# Patient Record
Sex: FEMALE | Race: WHITE | HISPANIC OR LATINO
[De-identification: names, ages, dates, MRNs, and addresses within clinical notes are randomized per-mention and may not be internally consistent; named-entity substitution may affect disease eponyms.]

---

## 2018-05-03 ENCOUNTER — APPOINTMENT (OUTPATIENT)
Dept: OTOLARYNGOLOGY | Facility: CLINIC | Age: 73
End: 2018-05-03
Payer: MEDICARE

## 2018-05-03 VITALS
HEIGHT: 62 IN | SYSTOLIC BLOOD PRESSURE: 122 MMHG | DIASTOLIC BLOOD PRESSURE: 80 MMHG | BODY MASS INDEX: 29.81 KG/M2 | WEIGHT: 162 LBS

## 2018-05-03 DIAGNOSIS — H93.11 TINNITUS, RIGHT EAR: ICD-10-CM

## 2018-05-03 DIAGNOSIS — H91.93 UNSPECIFIED HEARING LOSS, BILATERAL: ICD-10-CM

## 2018-05-03 DIAGNOSIS — Z78.9 OTHER SPECIFIED HEALTH STATUS: ICD-10-CM

## 2018-05-03 DIAGNOSIS — Z87.891 PERSONAL HISTORY OF NICOTINE DEPENDENCE: ICD-10-CM

## 2018-05-03 PROBLEM — Z00.00 ENCOUNTER FOR PREVENTIVE HEALTH EXAMINATION: Status: ACTIVE | Noted: 2018-05-03

## 2018-05-03 PROCEDURE — 92557 COMPREHENSIVE HEARING TEST: CPT

## 2018-05-03 PROCEDURE — 99203 OFFICE O/P NEW LOW 30 MIN: CPT | Mod: 25

## 2018-05-03 PROCEDURE — G0268 REMOVAL OF IMPACTED WAX MD: CPT

## 2018-05-03 PROCEDURE — 92550 TYMPANOMETRY & REFLEX THRESH: CPT

## 2022-08-30 ENCOUNTER — APPOINTMENT (OUTPATIENT)
Dept: SURGERY | Facility: CLINIC | Age: 77
End: 2022-08-30

## 2022-08-30 VITALS
WEIGHT: 155 LBS | OXYGEN SATURATION: 91 % | TEMPERATURE: 97.1 F | HEART RATE: 59 BPM | BODY MASS INDEX: 28.52 KG/M2 | HEIGHT: 62 IN

## 2022-08-30 PROCEDURE — 99202 OFFICE O/P NEW SF 15 MIN: CPT

## 2022-08-30 RX ORDER — MULTIVITAMIN
TABLET ORAL
Refills: 0 | Status: ACTIVE | COMMUNITY

## 2022-08-30 NOTE — HISTORY OF PRESENT ILLNESS
[de-identified] : Patient presents to the office with a history of large mass on the left posterior shoulder region that she noticed more than a year ago.  As per patient she is uncomfortable with that mass.

## 2022-08-30 NOTE — PHYSICAL EXAM
[Alert] : alert [Oriented to Person] : oriented to person [Oriented to Place] : oriented to place [Oriented to Time] : oriented to time [Calm] : calm [de-identified] : Patient is in no acute distress. [de-identified] : In the left posterior shoulder region patient has a mass approximately 6 cm plus in size in diameter.  The mass could be attached to the underlying muscle.  Is very large and unable to move it freely from the underlying structures. [de-identified] : There are no skin changes overlying the mass.

## 2022-08-30 NOTE — CONSULT LETTER
[Dear  ___] : Dear  [unfilled], [Consult Letter:] : I had the pleasure of evaluating your patient, [unfilled]. [Please see my note below.] : Please see my note below. [Consult Closing:] : Thank you very much for allowing me to participate in the care of this patient.  If you have any questions, please do not hesitate to contact me. [Sincerely,] : Sincerely, [FreeTextEntry3] : Mark Aguilar MD, FACS\par [facility]\[place] [address]\par

## 2022-08-30 NOTE — ASSESSMENT
[FreeTextEntry1] : After examination patient was explained about most likely diagnosis of lipoma.  She was also explained that most likely is not cancerous.  Patient is uncomfortable and would like to have the mass removed.  Surgery risks complications were explained in detail.  Patient understands the surgery and informed consent was obtained.  Surgery to be scheduled at her convenience.

## 2022-08-31 ENCOUNTER — NON-APPOINTMENT (OUTPATIENT)
Age: 77
End: 2022-08-31

## 2022-09-14 ENCOUNTER — OUTPATIENT (OUTPATIENT)
Dept: OUTPATIENT SERVICES | Facility: HOSPITAL | Age: 77
LOS: 1 days | Discharge: HOME | End: 2022-09-14

## 2022-09-14 ENCOUNTER — RESULT REVIEW (OUTPATIENT)
Age: 77
End: 2022-09-14

## 2022-09-14 VITALS
SYSTOLIC BLOOD PRESSURE: 180 MMHG | WEIGHT: 151.9 LBS | OXYGEN SATURATION: 99 % | RESPIRATION RATE: 16 BRPM | TEMPERATURE: 98 F | DIASTOLIC BLOOD PRESSURE: 90 MMHG | HEIGHT: 62 IN | HEART RATE: 64 BPM

## 2022-09-14 DIAGNOSIS — Z90.710 ACQUIRED ABSENCE OF BOTH CERVIX AND UTERUS: Chronic | ICD-10-CM

## 2022-09-14 DIAGNOSIS — Z90.49 ACQUIRED ABSENCE OF OTHER SPECIFIED PARTS OF DIGESTIVE TRACT: Chronic | ICD-10-CM

## 2022-09-14 DIAGNOSIS — D17.22 BENIGN LIPOMATOUS NEOPLASM OF SKIN AND SUBCUTANEOUS TISSUE OF LEFT ARM: ICD-10-CM

## 2022-09-14 DIAGNOSIS — Z01.818 ENCOUNTER FOR OTHER PREPROCEDURAL EXAMINATION: ICD-10-CM

## 2022-09-14 LAB
ALBUMIN SERPL ELPH-MCNC: 4.3 G/DL — SIGNIFICANT CHANGE UP (ref 3.5–5.2)
ALP SERPL-CCNC: 102 U/L — SIGNIFICANT CHANGE UP (ref 30–115)
ALT FLD-CCNC: 14 U/L — SIGNIFICANT CHANGE UP (ref 0–41)
ANION GAP SERPL CALC-SCNC: 11 MMOL/L — SIGNIFICANT CHANGE UP (ref 7–14)
AST SERPL-CCNC: 20 U/L — SIGNIFICANT CHANGE UP (ref 0–41)
BASOPHILS # BLD AUTO: 0.05 K/UL — SIGNIFICANT CHANGE UP (ref 0–0.2)
BASOPHILS NFR BLD AUTO: 1 % — SIGNIFICANT CHANGE UP (ref 0–1)
BILIRUB SERPL-MCNC: 0.5 MG/DL — SIGNIFICANT CHANGE UP (ref 0.2–1.2)
BUN SERPL-MCNC: 14 MG/DL — SIGNIFICANT CHANGE UP (ref 10–20)
CALCIUM SERPL-MCNC: 9.2 MG/DL — SIGNIFICANT CHANGE UP (ref 8.4–10.5)
CHLORIDE SERPL-SCNC: 107 MMOL/L — SIGNIFICANT CHANGE UP (ref 98–110)
CO2 SERPL-SCNC: 23 MMOL/L — SIGNIFICANT CHANGE UP (ref 17–32)
CREAT SERPL-MCNC: 1 MG/DL — SIGNIFICANT CHANGE UP (ref 0.7–1.5)
EGFR: 58 ML/MIN/1.73M2 — LOW
EOSINOPHIL # BLD AUTO: 0.14 K/UL — SIGNIFICANT CHANGE UP (ref 0–0.7)
EOSINOPHIL NFR BLD AUTO: 2.9 % — SIGNIFICANT CHANGE UP (ref 0–8)
GLUCOSE SERPL-MCNC: 88 MG/DL — SIGNIFICANT CHANGE UP (ref 70–99)
HCT VFR BLD CALC: 36.5 % — LOW (ref 37–47)
HGB BLD-MCNC: 11.7 G/DL — LOW (ref 12–16)
IMM GRANULOCYTES NFR BLD AUTO: 0.2 % — SIGNIFICANT CHANGE UP (ref 0.1–0.3)
LYMPHOCYTES # BLD AUTO: 1.47 K/UL — SIGNIFICANT CHANGE UP (ref 1.2–3.4)
LYMPHOCYTES # BLD AUTO: 30.3 % — SIGNIFICANT CHANGE UP (ref 20.5–51.1)
MCHC RBC-ENTMCNC: 27.7 PG — SIGNIFICANT CHANGE UP (ref 27–31)
MCHC RBC-ENTMCNC: 32.1 G/DL — SIGNIFICANT CHANGE UP (ref 32–37)
MCV RBC AUTO: 86.5 FL — SIGNIFICANT CHANGE UP (ref 81–99)
MONOCYTES # BLD AUTO: 0.28 K/UL — SIGNIFICANT CHANGE UP (ref 0.1–0.6)
MONOCYTES NFR BLD AUTO: 5.8 % — SIGNIFICANT CHANGE UP (ref 1.7–9.3)
NEUTROPHILS # BLD AUTO: 2.9 K/UL — SIGNIFICANT CHANGE UP (ref 1.4–6.5)
NEUTROPHILS NFR BLD AUTO: 59.8 % — SIGNIFICANT CHANGE UP (ref 42.2–75.2)
NRBC # BLD: 0 /100 WBCS — SIGNIFICANT CHANGE UP (ref 0–0)
PLATELET # BLD AUTO: 261 K/UL — SIGNIFICANT CHANGE UP (ref 130–400)
POTASSIUM SERPL-MCNC: 4.4 MMOL/L — SIGNIFICANT CHANGE UP (ref 3.5–5)
POTASSIUM SERPL-SCNC: 4.4 MMOL/L — SIGNIFICANT CHANGE UP (ref 3.5–5)
PROT SERPL-MCNC: 6.7 G/DL — SIGNIFICANT CHANGE UP (ref 6–8)
RBC # BLD: 4.22 M/UL — SIGNIFICANT CHANGE UP (ref 4.2–5.4)
RBC # FLD: 14.1 % — SIGNIFICANT CHANGE UP (ref 11.5–14.5)
SODIUM SERPL-SCNC: 141 MMOL/L — SIGNIFICANT CHANGE UP (ref 135–146)
WBC # BLD: 4.85 K/UL — SIGNIFICANT CHANGE UP (ref 4.8–10.8)
WBC # FLD AUTO: 4.85 K/UL — SIGNIFICANT CHANGE UP (ref 4.8–10.8)

## 2022-09-14 PROCEDURE — 71046 X-RAY EXAM CHEST 2 VIEWS: CPT | Mod: 26

## 2022-09-14 PROCEDURE — 93010 ELECTROCARDIOGRAM REPORT: CPT

## 2022-09-14 NOTE — H&P PST ADULT - HISTORY OF PRESENT ILLNESS
76yr old female states noted a lump on her LEFT back near the shoulder blade  about 1yr ago - " is  uncomfortable" is scheduled for EXCISION OF LEFT POSTERIOR SHOULDER MASS. Denies COVID S/S. Recd 2 doses vaccine. Verbalized understanding of COVID prevention measures. Exercise sharon 1-2  FOS.  Anesthesia Alert  NO--Difficult Airway  NO--History of neck surgery or radiation  NO--Limited ROM of neck  NO--History of Malignant hyperthermia  NO--Personal or family history of Pseudocholinesterase deficiency  NO--Prior Anesthesia Complication  NO--Latex Allergy  NO--Loose teeth  NO--History of Rheumatoid Arthritis  NO--CYNTHIA  No Bleeding risk  NO--Other_____

## 2022-09-25 ENCOUNTER — LABORATORY RESULT (OUTPATIENT)
Age: 77
End: 2022-09-25

## 2022-09-28 ENCOUNTER — TRANSCRIPTION ENCOUNTER (OUTPATIENT)
Age: 77
End: 2022-09-28

## 2022-09-28 ENCOUNTER — RESULT REVIEW (OUTPATIENT)
Age: 77
End: 2022-09-28

## 2022-09-28 ENCOUNTER — OUTPATIENT (OUTPATIENT)
Dept: OUTPATIENT SERVICES | Facility: HOSPITAL | Age: 77
LOS: 1 days | Discharge: HOME | End: 2022-09-28

## 2022-09-28 ENCOUNTER — APPOINTMENT (OUTPATIENT)
Dept: SURGERY | Facility: HOSPITAL | Age: 77
End: 2022-09-28

## 2022-09-28 VITALS — SYSTOLIC BLOOD PRESSURE: 151 MMHG | RESPIRATION RATE: 15 BRPM | DIASTOLIC BLOOD PRESSURE: 73 MMHG | HEART RATE: 70 BPM

## 2022-09-28 VITALS
OXYGEN SATURATION: 98 % | SYSTOLIC BLOOD PRESSURE: 192 MMHG | RESPIRATION RATE: 17 BRPM | TEMPERATURE: 97 F | WEIGHT: 149.91 LBS | HEIGHT: 62 IN | DIASTOLIC BLOOD PRESSURE: 86 MMHG | HEART RATE: 78 BPM

## 2022-09-28 DIAGNOSIS — Z90.710 ACQUIRED ABSENCE OF BOTH CERVIX AND UTERUS: Chronic | ICD-10-CM

## 2022-09-28 DIAGNOSIS — Z90.49 ACQUIRED ABSENCE OF OTHER SPECIFIED PARTS OF DIGESTIVE TRACT: Chronic | ICD-10-CM

## 2022-09-28 PROCEDURE — 88305 TISSUE EXAM BY PATHOLOGIST: CPT | Mod: 26

## 2022-09-28 PROCEDURE — 23076 EXC SHOULDER TUM DEEP < 5 CM: CPT

## 2022-09-28 RX ORDER — HYDROMORPHONE HYDROCHLORIDE 2 MG/ML
0.5 INJECTION INTRAMUSCULAR; INTRAVENOUS; SUBCUTANEOUS
Refills: 0 | Status: DISCONTINUED | OUTPATIENT
Start: 2022-09-28 | End: 2022-09-28

## 2022-09-28 RX ORDER — SODIUM CHLORIDE 9 MG/ML
1000 INJECTION, SOLUTION INTRAVENOUS
Refills: 0 | Status: DISCONTINUED | OUTPATIENT
Start: 2022-09-28 | End: 2022-09-28

## 2022-09-28 RX ORDER — ASPIRIN/CALCIUM CARB/MAGNESIUM 324 MG
1 TABLET ORAL
Qty: 0 | Refills: 0 | DISCHARGE

## 2022-09-28 RX ORDER — LISINOPRIL 2.5 MG/1
1 TABLET ORAL
Qty: 0 | Refills: 0 | DISCHARGE

## 2022-09-28 RX ADMIN — SODIUM CHLORIDE 75 MILLILITER(S): 9 INJECTION, SOLUTION INTRAVENOUS at 09:50

## 2022-09-28 NOTE — CHART NOTE - NSCHARTNOTEFT_GEN_A_CORE
PACU ANESTHESIA ADMISSION NOTE      Procedure: Excision, neoplasm, subfascial soft tissue, shoulder region, less than 5 cm    Excision, intramuscular tumor, shoulder area, less than 5 cm      Post op diagnosis:  Lipoma of left shoulder        ____  Intubated  TV:______       Rate: ______      FiO2: ______    _x___  Patent Airway    _x___  Full return of protective reflexes    _x___  Full recovery from anesthesia / back to baseline status    Vitals:  T(C): 37.5  HR: 66  BP: 148/65  RR: 17   SpO2: 100%    Mental Status:  _x___ Awake   _____ Alert   _____ Drowsy   _____ Sedated    Nausea/Vomiting:  _x___  NO       ______Yes,   See Post - Op Orders         Pain Scale (0-10):  __0___    Treatment: _x___ None    ____ See Post - Op/PCA Orders    Post - Operative Fluids:   __x__ Oral   ____ See Post - Op Orders    Plan: Discharge:   _x___Home       _____Floor     _____Critical Care    _____  Other:_________________    Comments:  No anesthesia issues or complications noted.  Discharge when criteria met.

## 2022-09-28 NOTE — BRIEF OPERATIVE NOTE - NSICDXBRIEFPROCEDURE_GEN_ALL_CORE_FT
PROCEDURES:  Excision, neoplasm, subfascial soft tissue, shoulder region, less than 5 cm 28-Sep-2022 09:18:10  Mark Aguilar

## 2022-09-28 NOTE — ASU PATIENT PROFILE, ADULT - FALL HARM RISK - UNIVERSAL INTERVENTIONS
Bed in lowest position, wheels locked, appropriate side rails in place/Call bell, personal items and telephone in reach/Instruct patient to call for assistance before getting out of bed or chair/Non-slip footwear when patient is out of bed/[place] to call system/Physically safe environment - no spills, clutter or unnecessary equipment/Purposeful Proactive Rounding/Room/bathroom lighting operational, light cord in reach

## 2022-09-28 NOTE — ASU DISCHARGE PLAN (ADULT/PEDIATRIC) - NS MD DC FALL RISK RISK
For information on Fall & Injury Prevention, visit: [email or web address] OR  [email or web address] OR  https://www.cdc.gov/steadi/patient.html

## 2022-09-28 NOTE — ASU DISCHARGE PLAN (ADULT/PEDIATRIC) - CARE PROVIDER_API CALL
Mark Aguilar)  Surgery  [address] [place] [address]  Phone: [phone]  Fax: [phone]  Follow Up Time:

## 2022-09-28 NOTE — ASU DISCHARGE PLAN (ADULT/PEDIATRIC) - ASU DC SPECIAL INSTRUCTIONSFT
Please do not shower until 24 hours post surgery. Upon showering, pat dressing dry. May remove dressing in 48 hours. Please take Tylenol and Motrin as needed for pain relief. You may resume your regular diet. Please reji the office if you have any questions or concerns at the number provided below. Please call number provided to make a follow up appointment with Dr. Aguilar in 1-2 weeks.

## 2022-09-28 NOTE — ASU PREOP CHECKLIST - DENTURES
What Type Of Note Output Would You Prefer (Optional)?: Bullet Format
How Severe Is Your Rash?: mild
Is This A New Presentation, Or A Follow-Up?: Rash
no

## 2022-09-30 LAB — SURGICAL PATHOLOGY STUDY: SIGNIFICANT CHANGE UP

## 2022-10-04 DIAGNOSIS — Z79.82 LONG TERM (CURRENT) USE OF ASPIRIN: ICD-10-CM

## 2022-10-04 DIAGNOSIS — R22.32 LOCALIZED SWELLING, MASS AND LUMP, LEFT UPPER LIMB: ICD-10-CM

## 2022-10-04 DIAGNOSIS — D17.22 BENIGN LIPOMATOUS NEOPLASM OF SKIN AND SUBCUTANEOUS TISSUE OF LEFT ARM: ICD-10-CM

## 2022-10-06 ENCOUNTER — APPOINTMENT (OUTPATIENT)
Dept: SURGERY | Facility: CLINIC | Age: 77
End: 2022-10-06

## 2022-10-06 VITALS
DIASTOLIC BLOOD PRESSURE: 84 MMHG | BODY MASS INDEX: 28.52 KG/M2 | WEIGHT: 155 LBS | HEART RATE: 57 BPM | OXYGEN SATURATION: 99 % | TEMPERATURE: 96.9 F | HEIGHT: 62 IN | SYSTOLIC BLOOD PRESSURE: 126 MMHG

## 2022-10-06 DIAGNOSIS — D17.22 BENIGN LIPOMATOUS NEOPLASM OF SKIN AND SUBCUTANEOUS TISSUE OF LEFT ARM: ICD-10-CM

## 2022-10-06 PROCEDURE — 99024 POSTOP FOLLOW-UP VISIT: CPT

## 2022-10-06 NOTE — ASSESSMENT
[FreeTextEntry1] : healing well.
gait, locomotion, and balance/joint integrity and mobility/muscle strength/neuromotor development and sensory integration/posture

## 2022-10-06 NOTE — REASON FOR VISIT
[Follow-Up: _____] : a [unfilled] follow-up visit [FreeTextEntry1] : Pt here for post op visit exc of left posterior shoulder

## 2023-01-17 ENCOUNTER — EMERGENCY (EMERGENCY)
Facility: HOSPITAL | Age: 78
LOS: 0 days | Discharge: HOME | End: 2023-01-17
Attending: EMERGENCY MEDICINE | Admitting: EMERGENCY MEDICINE
Payer: MEDICARE

## 2023-01-17 VITALS
OXYGEN SATURATION: 97 % | TEMPERATURE: 98 F | DIASTOLIC BLOOD PRESSURE: 93 MMHG | RESPIRATION RATE: 18 BRPM | HEART RATE: 69 BPM | SYSTOLIC BLOOD PRESSURE: 210 MMHG

## 2023-01-17 VITALS — SYSTOLIC BLOOD PRESSURE: 203 MMHG | DIASTOLIC BLOOD PRESSURE: 85 MMHG

## 2023-01-17 DIAGNOSIS — Z90.710 ACQUIRED ABSENCE OF BOTH CERVIX AND UTERUS: Chronic | ICD-10-CM

## 2023-01-17 DIAGNOSIS — Z90.710 ACQUIRED ABSENCE OF BOTH CERVIX AND UTERUS: ICD-10-CM

## 2023-01-17 DIAGNOSIS — M25.532 PAIN IN LEFT WRIST: ICD-10-CM

## 2023-01-17 DIAGNOSIS — Z90.49 ACQUIRED ABSENCE OF OTHER SPECIFIED PARTS OF DIGESTIVE TRACT: Chronic | ICD-10-CM
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
## 2023-01-17 DIAGNOSIS — Z79.82 LONG TERM (CURRENT) USE OF ASPIRIN: ICD-10-CM

## 2023-01-17 DIAGNOSIS — S92.352A DISPLACED FRACTURE OF FIFTH METATARSAL BONE, LEFT FOOT, INITIAL ENCOUNTER FOR CLOSED FRACTURE: ICD-10-CM

## 2023-01-17 DIAGNOSIS — M79.672 PAIN IN LEFT FOOT: ICD-10-CM

## 2023-01-17 DIAGNOSIS — H91.90 UNSPECIFIED HEARING LOSS, UNSPECIFIED EAR: ICD-10-CM

## 2023-01-17 DIAGNOSIS — S52.572A OTHER INTRAARTICULAR FRACTURE OF LOWER END OF LEFT RADIUS, INITIAL ENCOUNTER FOR CLOSED FRACTURE: ICD-10-CM
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
## 2023-01-17 DIAGNOSIS — S92.342A DISPLACED FRACTURE OF FOURTH METATARSAL BONE, LEFT FOOT, INITIAL ENCOUNTER FOR CLOSED FRACTURE: ICD-10-CM

## 2023-01-17 DIAGNOSIS — S92.332A DISPLACED FRACTURE OF THIRD METATARSAL BONE, LEFT FOOT, INITIAL ENCOUNTER FOR CLOSED FRACTURE: ICD-10-CM

## 2023-01-17 DIAGNOSIS — I10 ESSENTIAL (PRIMARY) HYPERTENSION: ICD-10-CM

## 2023-01-17 DIAGNOSIS — Z90.49 ACQUIRED ABSENCE OF OTHER SPECIFIED PARTS OF DIGESTIVE TRACT: ICD-10-CM

## 2023-01-17 PROCEDURE — 73110 X-RAY EXAM OF WRIST: CPT | Mod: 26,LT

## 2023-01-17 PROCEDURE — 99285 EMERGENCY DEPT VISIT HI MDM: CPT | Mod: 57

## 2023-01-17 PROCEDURE — 28470 CLTX METATARSAL FX WO MNP EA: CPT | Mod: 54

## 2023-01-17 PROCEDURE — 73630 X-RAY EXAM OF FOOT: CPT | Mod: 26,LT

## 2023-01-17 RX ORDER — IBUPROFEN 200 MG
600 TABLET ORAL ONCE
Refills: 0 | Status: COMPLETED | OUTPATIENT
Start: 2023-01-17 | End: 2023-01-17

## 2023-01-17 RX ADMIN — Medication 600 MILLIGRAM(S): at 20:25

## 2023-01-17 NOTE — ED PROVIDER NOTE - CLINICAL SUMMARY MEDICAL DECISION MAKING FREE TEXT BOX
77-year-old female no antiplatelet or anticoagulation presents with trip and fall over a tree branch this afternoon around 2:30 PM landed on left side no close head injury no LOC was helped out by bystanders.  Went to urgent care had x-rays that are confirmed in ED here today patient sustained  on independent  interpretation of wrist and foot xray -  left distal radius impacted fracture with mild angulation nvi.  Multiple metatarsal fractures left foot.  Posterior splint is applied to the left foot sugar-tong splint to left wrist patient understands she needs follow-up with orthopedic surgery follow-up arranged.  No other injuries on exam.

## 2023-01-17 NOTE — ED ADULT NURSE NOTE - NSIMPLEMENTINTERV_GEN_ALL_ED
Implemented All Fall with Harm Risk Interventions:  [place] to call system. Call bell, personal items and telephone within reach. Instruct patient to call for assistance. Room bathroom lighting operational. Non-slip footwear when patient is off stretcher. Physically safe environment: no spills, clutter or unnecessary equipment. Stretcher in lowest position, wheels locked, appropriate side rails in place. Provide visual cue, wrist band, yellow gown, etc. Monitor gait and stability. Monitor for mental status changes and reorient to person, place, and time. Review medications for side effects contributing to fall risk. Reinforce activity limits and safety measures with patient and family. Provide visual clues: red socks.

## 2023-01-17 NOTE — ED PROVIDER NOTE - OBJECTIVE STATEMENT
77 year old female past medical history of Hypertension comes to emergency room status post fall and broken left wrist/foot. patient denies head injury and loss of consciousness. Pt seen in out patient urgent care center and had xray which showed broken bones and sent to emergency room for further evaluation.

## 2023-01-17 NOTE — ED PROVIDER NOTE - NS ED ATTENDING STATEMENT MOD
I have seen and examined this patient and fully participated in the care of this patient as the teaching attending.  The service was shared with the ARLENE.  I reviewed and verified the documentation and independently performed the documented:

## 2023-01-17 NOTE — ED PROVIDER NOTE - NSFOLLOWUPINSTRUCTIONS_ED_ALL_ED_FT
Our Emergency Department Referral Coordinators will be reaching out ot you in the next 24-48 hours from 9:00am to 5:00pm (Monday to Friday) with a follow up appointment. Please expect a phone call from the hospital in that time frame. If you do not receive a call or if you have any questions or concerns, you can reach them at [phone] or [phone].       Use wheelchair to get around given foot and wrist fracture     SPLINT CARE - AfterCare(R) Instructions(ER/ED)     Splint Care    WHAT YOU NEED TO KNOW:    Splint care is important to help protect your splint until it comes off. Some splints are made of fiberglass or plaster that will need to dry and harden. Splint care will help the splint dry and harden correctly. Even after your splint hardens, it can be damaged.    DISCHARGE INSTRUCTIONS:    Return to the emergency department if:     You have increased pain.      Your fingers or toes are numb or tingling.      You feel burning or stinging around your injury.      Your nails, fingers, or toes turn pale, blue, or gray, and feel cold.      You have new or increased trouble moving your fingers or toes.      Your swelling gets worse.      The skin under your splint is bleeding or leaking pus.     Contact your healthcare provider if:     Your hard splint gets wet or is damaged.      You have a fever.      Your splint feels tighter.      You have itchy, dry skin under your splint that is getting worse.      The skin under your splint is red, or you have a new sore.      You notice a bad smell coming from your splint.       You have questions or concerns about your condition or care.    How to care for your splint:     Wait for your hard splint to harden completely. You may have to wait up to 3 days before you can walk on a plaster splint.      Check your splint and the skin around it each day. Check your splint for damage, such as cracks and breaks. Check your skin for redness, increased swelling, and sores. Loosen the elastic bandage around your splint if it feels too tight.      Keep your splint clean and dry. Keep dirt out of your splint. Before you bathe, wrap your hard splint with 2 layers of plastic. Then put a plastic bag over it. Keep the plastic bag tightly sealed. You can also ask your healthcare provider about waterproof shields. Do not put your hard splint in the water, even with a plastic bag over it. A wet splint can make your skin itchy, and may lead to infection.      Do not put powders or deodorants inside your splint. These can dry your skin and increase itching.       Do not try to scratch the skin inside your hard splint with sharp objects. Sharp objects can break off inside your splint or hurt your skin.       Do not pull the padding out of your splint. The padding inside your splint protects your skin. You may develop a sore on your skin if you take out the padding.    Follow up with your healthcare provider as directed within 1 to 2 weeks: Write down your questions so you remember to ask them during your visits.          Wrist Fracture Treated With Immobilization  ImageA wrist fracture is a break or crack in one of the bones of your wrist. Your wrist is made up of eight small bones at the palm of your hand (carpal bones) and two long bones that make up your forearm (radius and ulna).    If the joint is stable and the bones are still in their normal position (nondisplaced), the injury may be treated with immobilization. This involves the use of a cast, splint, or sling to hold your arm in place. Immobilization ensures that your bones continue to stay in the correct position while your arm is healing.    What are the causes?  This condition may be caused by:    A direct force to the wrist.  Falling on an outstretched hand.  Trauma, such as a car accident or a fall.    What increases the risk?  This condition is more likely to develop in people who:    Do contact and high-risk sports, such as skiing, biking, and ice skating.  Take steroid medicines.  Smoke.  Are female.  Are .  Drink more than three alcoholic beverages per day.  Have low or lowered bone density (osteoporosis or osteopenia).  Are older.  Have a history of previous fractures.    What are the signs or symptoms?  Symptoms of this condition include:    Pain.  Swelling.  Bruising.  Not being able to move the wrist normally.    Additionally, the wrist may hang in an odd position or appear deformed.    How is this diagnosed?  This condition may be diagnosed based on a physical exam and X-rays. You may also have a CT scan or MRI.    How is this treated?  Treatment for this condition involves wearing a cast or splint until the injured area is stable enough for you to begin range-of-motion exercises. You also may be given a sling. You may also be prescribed pain medicine.    Follow these instructions at home:  If you have a splint:     Wear the splint as told by your health care provider. Remove it only as told by your health care provider.  Loosen the splint if your fingers tingle, become numb, or turn cold and blue.  Do not let your splint get wet if it is not waterproof.  Keep the splint clean.  If you have a sling:     Wear it as told by your health care provider. Remove it only as told by your health care provider.  If you have a cast:     Do not stick anything inside the cast to scratch your skin. Doing that increases your risk of infection.  Check the skin around the cast every day. Report any concerns to your health care provider. You may put lotion on dry skin around the edges of the cast. Do not apply lotion to the skin underneath the cast.  Do not let your cast get wet if it is not waterproof.  Keep the cast clean.  Bathing     Do not take baths, swim, or use a hot tub until your health care provider approves. Ask your health care provider if you can take showers. You may only be allowed to take sponge baths for bathing.  If your cast or splint is not waterproof, cover it with a watertight plastic bag when you take a bath or a shower.  If you have a sling, remove it for bathing only if your health care provider tells you that it is safe to do that.  Managing pain, stiffness, and swelling     If directed, apply ice to the injured area.    Put ice in a plastic bag.  Place a towel between your skin and the bag.  Leave the ice on for 20 minutes, 2–3 times per day.    Move your fingers often to avoid stiffness and to lessen swelling.  Raise (elevate) the injured area above the level of your heart while you are sitting or lying down.  Driving     Do not drive or operate heavy machinery while taking prescription pain medicine.  Ask your health care provider when it is safe to drive if you have a cast, splint, or sling on your wrist.  Activity     Return to your normal activities as told by your health care provider. Ask your health care provider what activities are safe for you.  Do range-of-motion exercises only as told by your health care provider or physical therapist.  General instructions     Do not put pressure on any part of the cast or splint until it is fully hardened. This may take several hours.  Do not use any tobacco products, such as cigarettes, chewing tobacco, and e-cigarettes. Tobacco can delay bone healing. If you need help quitting, ask your health care provider.  Take over-the-counter and prescription medicines only as told by your health care provider.  Keep all follow-up visits as told by your health care provider. This is important.  Contact a health care provider if:  Your cast, splint, or sling is damaged or loose.  You have any new pain, swelling, or bruising.  Your pain, swelling, and bruising do not improve.  You have a fever.  You have chills.  Get help right away if:  Your skin or fingers on your injured arm turn blue or gray.  Your arm feels cold or gets numb.  You have severe pain in your injured wrist.  This information is not intended to replace advice given to you by your health care provider. Make sure you discuss any questions you have with your health care provider          WHAT YOU NEED TO KNOW:    A foot fracture is a break in a bone in your foot.    Foot Anatomy         DISCHARGE INSTRUCTIONS:    Call your local emergency number (911 in the [place]) if:   •You suddenly feel lightheaded and short of breath.      •You have chest pain when you take a deep breath or cough.      •You cough up blood.      Return to the emergency department if:   •The pain in your injured foot gets worse even after you rest and take pain medicine.      •The skin or toes of your foot become numb, swollen, cold, white, or blue.      •You have more pain or swelling than you did before a cast was put on.      •Your leg feels warm, tender, and painful. It may look swollen and red.      Call your doctor if:   •You have a fever.      •You have new sores around your boot, cast, or splint.      •You have new or worsening trouble moving your foot.      •You notice a foul smell coming from under your cast.      •Your boot, cast, or splint gets damaged.      •You have questions or concerns about your condition or care.      Medicines: You may need any of the following:   •Antibiotics: This medicine is given to help treat or prevent an infection caused by bacteria.       •NSAIDs, such as ibuprofen, help decrease swelling, pain, and fever. NSAIDs can cause stomach bleeding or kidney problems in certain people. If you take blood thinner medicine, always ask your healthcare provider if NSAIDs are safe for you. Always read the medicine label and follow directions.      •Prescription pain medicine may be given. Ask your healthcare provider how to take this medicine safely. Some prescription pain medicines contain acetaminophen. Do not take other medicines that contain acetaminophen without talking to your healthcare provider. Too much acetaminophen may cause liver damage. Prescription pain medicine may cause constipation. Ask your healthcare provider how to prevent or treat constipation.       •Take your medicine as directed. Contact your healthcare provider if you think your medicine is not helping or if you have side effects. Tell him or her if you are allergic to any medicine. Keep a list of the medicines, vitamins, and herbs you take. Include the amounts, and when and why you take them. Bring the list or the pill bottles to follow-up visits. Carry your medicine list with you in case of an emergency.      Self-care:   •Rest your foot and avoid activities that cause pain.      •Apply ice to decrease swelling and pain, and to prevent tissue damage. Use an ice pack, or put crushed ice in a plastic bag. Cover it with a towel before you apply it. Use ice for 15 to 20 minutes every hour or as directed.      •Elevate your foot above the level of your heart as often as you can. This will help decrease swelling and pain. Prop your foot on pillows or blankets to keep it elevated comfortably.  Elevate Leg           •Physical therapy may be needed when your foot has healed. A physical therapist can teach you exercises to help improve movement and strength, and to decrease pain.      Cast or splint care:   •Ask when it is okay to take a bath or shower. Do not let your cast or splint get wet. Before you bathe, cover the cast or splint with a plastic bag. Tape the bag to your skin above the splint to seal out water. Keep your foot out of the water in case the bag leaks.      •Check the skin around your splint daily for any redness or open areas.      •Do not use a sharp or pointed object to scratch your skin under the splint.      •Do not remove your splint unless your healthcare provider or orthopedic surgeon says it is okay.      Assistive devices: You may be given a hard-soled shoe to wear while your foot is healing. You also may need to use crutches to help you walk while your foot heals. It is important to use your crutches correctly. Ask for more information about how to use crutches.    Follow up with your doctor or bone specialist as directed: You may need to return to have your splint or stitches removed. You may also need to return for tests to make sure your foot is healing. Write down your questions so you remember to ask them during your visits.

## 2023-01-17 NOTE — ED PROVIDER NOTE - PHYSICAL EXAMINATION
Physical Exam    Vital Signs: I have reviewed the initial vital signs.  Constitutional: well-nourished, appears stated age, no acute distress  Musculoskeletal: + left wrist swelling and tenderness with equal radial pulses, no snuff box tenderness, + left foot tenderness and swelling over dorsum with ecchymosis DP pulses are equal.   Integumentary: warm, dry, no rash  Neurologic: awake, alert,  extremities’ motor and sensory functions grossly intact  Psychiatric: appropriate mood, appropriate affect

## 2023-01-17 NOTE — ED PROVIDER NOTE - CARE PLAN
1 Principal Discharge DX:	Wrist fracture, closed  Secondary Diagnosis:	Multiple closed fractures of metatarsal bone

## 2023-01-17 NOTE — ED PROVIDER NOTE - NSPTACCESSSVCSAPPTDETAILS_ED_ALL_ED_FT
please arrange follow up  1-2 days  -  pt has  fracture multiple metatarsals  left foot,  and left  distal radius fracture  -  using wheelchair given  upper and lower extremity

## 2023-01-17 NOTE — ED PROVIDER NOTE - PATIENT PORTAL LINK FT
You can access the FollowMyHealth Patient Portal offered by [facility] by registering at the following website: [email or web address] By joining [facility]’s FollowMyHealth portal, you will also be able to view your health information using other applications (apps) compatible with our system.

## 2023-01-17 NOTE — ED PROVIDER NOTE - CARE PROVIDER_API CALL
Timo Reyna (MD)  Orthopaedic Surgery  [address] [place] [address]  Phone: [phone]  Fax: [phone]  Follow Up Time: 1-3 Days

## 2023-01-18 PROBLEM — I10 ESSENTIAL (PRIMARY) HYPERTENSION: Chronic | Status: ACTIVE | Noted: 2022-09-14

## 2023-01-18 PROBLEM — H91.90 UNSPECIFIED HEARING LOSS, UNSPECIFIED EAR: Chronic | Status: ACTIVE | Noted: 2022-09-14

## 2023-01-18 NOTE — ED PROCEDURE NOTE - PROCEDURE ADDITIONAL DETAILS
Left wrist - Placed in Sugartong splint  Left foot - placed in posterior splint     Pt has wheel chair at home

## 2023-01-18 NOTE — ED PROCEDURE NOTE - NS ED ATTENDING STATEMENT MOD
This was a shared visit with the ARLENE. I reviewed and verified the documentation and independently performed the documented:

## 2023-01-20 ENCOUNTER — APPOINTMENT (OUTPATIENT)
Dept: ORTHOPEDIC SURGERY | Facility: CLINIC | Age: 78
End: 2023-01-20
Payer: MEDICARE

## 2023-01-20 VITALS — WEIGHT: 152 LBS | BODY MASS INDEX: 27.97 KG/M2 | HEIGHT: 62 IN

## 2023-01-20 PROCEDURE — 99203 OFFICE O/P NEW LOW 30 MIN: CPT | Mod: 25

## 2023-01-20 PROCEDURE — 29075 APPL CST ELBW FNGR SHORT ARM: CPT | Mod: LT

## 2023-01-20 PROCEDURE — 73110 X-RAY EXAM OF WRIST: CPT | Mod: LT

## 2023-01-20 PROCEDURE — L4361: CPT | Mod: LT

## 2023-01-20 NOTE — PHYSICAL EXAM
[Left] : left foot and ankle [2+] : dorsalis pedis pulse: 2+ [] : patient ambulates without assistive device [de-identified] :   Physical exam of the left wrist:  Mild edema noted.  There is ecchymosis noted by the distal radius. [FreeTextEntry9] :   Significantly decreased range of motion with extension, flexion of the left wrist.  Good range of motion with ulnar and radial deviation of the left wrist. [FreeTextEntry3] : There is edema and ecchymosis noted over the dorsal surface of the left foot in the area the 2nd, 3rd, and 4th metatarsals. [FreeTextEntry8] :   Significant tenderness to palpation over the distal aspect of the 4th metatarsal shaft.  Mild tenderness to palpation over the distal aspect of the 2nd and 3rd metatarsal shafts.

## 2023-01-20 NOTE — DATA REVIEWED
[Outside X-rays] : outside x-rays [Left] : left [Wrist] : wrist [FreeTextEntry1] :   X-rays taken 01/17/2022 at [facility] of the left wrist showed an intra-articular distal radius fracture.  There was no acceptable lateral view. [FreeTextEntry2] :   X-rays taken here in the office today of the left wrist were reviewed, three views were obtained:  On the AP view x-rays show an intra-articular distal radius fracture.  On the lateral view she has do not show much displacement. [FreeTextEntry3] : Post casting x-rays here of the left wrist were obtained and show the fracture in acceptable anatomic alignment.

## 2023-01-20 NOTE — DISCUSSION/SUMMARY
[de-identified] :   I reviewed the x-ray findings with the patient.  Regarding her wrist today she was placed into a fiberglass short cast.  Post casting x-rays of the left wrist were obtained here and show:  the fracture in acceptable anatomic alignment.  Regarding her left foot she was placed into a short Cam walker boot.  She can weightbear as tolerated.  She will use warm water soaks with Epsom salt for her foot.  She may continue taking ibuprofen for pain.  She will follow up in 10 days for further evaluation with Dr. Duarte for her left wrist.  She will follow up in 4-5 weeks for further evaluation with CARMELO Rodriguez  for her left foot.\par \par  Supervising physician:  Dr. Duarte

## 2023-01-20 NOTE — HISTORY OF PRESENT ILLNESS
[de-identified] : The patient is a 77-year-old female right-hand-dominant here for evaluation of her left wrist and her left foot.  She fell down on 01/17/2023.  She is accompanied by her friend here today.  She was seen in the emergency department at [facility] and had x-rays left wrist showed a displaced left distal radius fracture.  She presents today in a sugar-tong splint.  She had x-rays of the left foot that showed mildly displaced fractures of the 2nd, 3rd, 4th distal metatarsal shafts.

## 2023-02-03 ENCOUNTER — APPOINTMENT (OUTPATIENT)
Dept: ORTHOPEDIC SURGERY | Facility: CLINIC | Age: 78
End: 2023-02-03
Payer: MEDICARE

## 2023-02-03 PROCEDURE — 73110 X-RAY EXAM OF WRIST: CPT | Mod: LT

## 2023-02-03 PROCEDURE — 99204 OFFICE O/P NEW MOD 45 MIN: CPT | Mod: 25

## 2023-02-03 NOTE — ASSESSMENT
[FreeTextEntry1] : The patient comes in status post distal radius fracture.  She is in a short-arm cast.  She does not have complaints today.  She is right-hand dominant.  She broke her left wrist.\par \par   Left upper extremity: In short-arm cast which is fitting her well, full range of motion things of the cast, full range of motion the elbow, neurovascular intact\par \par  x-rays show dorsal angulation intra-articular distal radius fracture\par \par I discussed with the patient and the family that the nature of the fracture is one that would warrant surgery to restore length, alignment, and rotation. We discussed the r/b/a of surgical and nonsurgical treatment options.  The patient/family understand that without surgery they are at increased risk of arthritis, pain, loss of motion, loss of strength, ulnar loading/outer wrist pain and deformity.  The patient/family would like to avoid surgery understanding the risks. They understand that they can change their mind and I would be amenable to fixing the fracture primarily up to three weeks from the time of injury.  After that time, if they wanted fixation, I would let the fracture heal, see how they do, and perform malunion surgery if they are still interested.\par \par Patient will return in 3 weeks to remove cast and get new films after the cast is removed.

## 2023-02-24 ENCOUNTER — RESULT CHARGE (OUTPATIENT)
Age: 78
End: 2023-02-24

## 2023-02-24 ENCOUNTER — APPOINTMENT (OUTPATIENT)
Dept: ORTHOPEDIC SURGERY | Facility: CLINIC | Age: 78
End: 2023-02-24
Payer: MEDICARE

## 2023-02-24 VITALS — BODY MASS INDEX: 27.97 KG/M2 | WEIGHT: 152 LBS | HEIGHT: 62 IN

## 2023-02-24 PROCEDURE — 73630 X-RAY EXAM OF FOOT: CPT | Mod: LT

## 2023-02-24 PROCEDURE — 99213 OFFICE O/P EST LOW 20 MIN: CPT

## 2023-02-24 NOTE — HISTORY OF PRESENT ILLNESS
[de-identified] : 77-year-old female is here today for follow-up of her left foot.  She had an injury on January 17 which she injured her left wrist and her left foot.  She has a distal radius fracture that is being treated by Dr. Duarte.  She also had fractures of the second third and fourth metatarsal necks.  He has been in a cam walker boot, she is now almost 6 weeks out from the injury.  She states she is feeling much better, she has very mild pain.  She denies any numbness tingling in the foot or any calf pain.

## 2023-02-24 NOTE — IMAGING
[de-identified] : On examination of her left foot she has minimal swelling, no erythema, no ecchymosis.  She is nontender over the medial or lateral malleolus, ATFL, CFL, PT FL or deltoid ligament.  She is nontender over the talar dome.  She is nontender over the Achilles or the calcaneus, negative Velazquez's test, no calf tenderness.  She has minimal tenderness palpation over the second third and fourth distal metatarsals.  She is nontender over the Lisfranc joint.  She has good range of motion of the ankle, sensation is intact throughout, 2+ DP and PT pulses.\par \par X-rays repeated in the office today of the left foot, weightbearing, show well healing minimally displaced second third and fourth metatarsal neck fractures.  The alignment is unchanged from the previous x-rays, there is more callus formation.

## 2023-02-24 NOTE — DISCUSSION/SUMMARY
[de-identified] : At this time she can come out of the cam walker boot into a stiff soled shoe.  She can weight-bear as tolerated.  No other activity.  I will see her back in 4 to 6 weeks for repeat x-rays and evaluation. Patient will call me if any other problems or concerns.  Patient verbalized understanding and agreed with the plan, all questions were answered in the office today.\par

## 2023-03-01 ENCOUNTER — APPOINTMENT (OUTPATIENT)
Dept: ORTHOPEDIC SURGERY | Facility: CLINIC | Age: 78
End: 2023-03-01
Payer: MEDICARE

## 2023-03-01 PROCEDURE — 73110 X-RAY EXAM OF WRIST: CPT | Mod: LT

## 2023-03-01 PROCEDURE — 99213 OFFICE O/P EST LOW 20 MIN: CPT

## 2023-03-01 NOTE — ASSESSMENT
[FreeTextEntry1] : The patient comes in status post distal radius fracture. She does not have complaints today. Her cast was removed today.  She says she has a little bit of pain on the ulnar aspect of the wrist\par \par   Left upper extremity: Intact after cast removal, decreased range of motion of wrist, full range of motion of fingers, full range of motion the elbow, neurovascular intact\par \par  x-rays show dorsal angulation intra-articular distal radius fracture, healing no change in alignment\par \par Patient is doing well. She was placed in a cock-up brace.  She is going to remove the brace for range of motion.  She will wear it at nighttime and when she leaves the home or is concerned she could injure herself.  I discussed the no heavy lifting pushing or pulling.  Patient will return in 3 weeks to get new films

## 2023-03-22 ENCOUNTER — APPOINTMENT (OUTPATIENT)
Dept: ORTHOPEDIC SURGERY | Facility: CLINIC | Age: 78
End: 2023-03-22
Payer: MEDICARE

## 2023-03-22 PROCEDURE — 73110 X-RAY EXAM OF WRIST: CPT | Mod: LT

## 2023-03-22 PROCEDURE — 99213 OFFICE O/P EST LOW 20 MIN: CPT

## 2023-03-22 NOTE — ASSESSMENT
[FreeTextEntry1] : The patient comes in status post distal radius fracture. She says she has a little bit of pain and noticed that she has swelling on the ulnar aspect of the wrist.She does not have complaints today. She has been wearing her brace. \par \par   Left upper extremity:  decreased range of motion of wrist, full range of motion of fingers, full range of motion the elbow, neurovascular intact\par \par  x-rays show dorsal angulation intra-articular distal radius fracture, healing no change in alignment\par \par Patient is doing well.  She does not need to wear the brace anymore.  She is going to work on range of motion.  She will wear the brace if she feels she is in a situation she could injure herself.  In addition I discussed with her considering therapy because of the decreased range of motion but she would like to hold off.  I did discuss with her that the swelling on the ulnar aspect of the wrist most likely is not going to change secondary to the malunion of the distal radius.  The patient seemed understand after discussing this thoroughly with her.

## 2023-03-24 ENCOUNTER — APPOINTMENT (OUTPATIENT)
Dept: ORTHOPEDIC SURGERY | Facility: CLINIC | Age: 78
End: 2023-03-24
Payer: MEDICARE

## 2023-03-24 VITALS — BODY MASS INDEX: 27.97 KG/M2 | HEIGHT: 62 IN | WEIGHT: 152 LBS

## 2023-03-24 DIAGNOSIS — S92.335A NONDISPLACED FRACTURE OF THIRD METATARSAL BONE, LEFT FOOT, INITIAL ENCOUNTER FOR CLOSED FRACTURE: ICD-10-CM

## 2023-03-24 DIAGNOSIS — S92.345A NONDISPLACED FRACTURE OF FOURTH METATARSAL BONE, LEFT FOOT, INITIAL ENCOUNTER FOR CLOSED FRACTURE: ICD-10-CM

## 2023-03-24 DIAGNOSIS — S92.325A NONDISPLACED FRACTURE OF SECOND METATARSAL BONE, LEFT FOOT, INITIAL ENCOUNTER FOR CLOSED FRACTURE: ICD-10-CM

## 2023-03-24 PROCEDURE — 73630 X-RAY EXAM OF FOOT: CPT | Mod: LT

## 2023-03-24 PROCEDURE — 99213 OFFICE O/P EST LOW 20 MIN: CPT

## 2023-03-28 PROBLEM — S92.345A CLOSED NONDISPLACED FRACTURE OF FOURTH METATARSAL BONE OF LEFT FOOT, INITIAL ENCOUNTER: Status: ACTIVE | Noted: 2023-01-20

## 2023-03-28 PROBLEM — S92.325A CLOSED NONDISPLACED FRACTURE OF SECOND METATARSAL BONE OF LEFT FOOT, INITIAL ENCOUNTER: Status: ACTIVE | Noted: 2023-01-20

## 2023-03-28 PROBLEM — S92.335A CLOSED NONDISPLACED FRACTURE OF THIRD METATARSAL BONE OF LEFT FOOT, INITIAL ENCOUNTER: Status: ACTIVE | Noted: 2023-01-20

## 2023-03-28 NOTE — HISTORY OF PRESENT ILLNESS
[de-identified] : 77-year-old female is here today for follow-up of her left foot.  She had an injury on January 17 which she injured her left wrist and her left foot.  She has a distal radius fracture that is being treated by Dr. Duarte.  She also had fractures of the second third and fourth metatarsal necks.  She is now almost 10 weeks out. She states she is feeling much better, she has no pain.  She denies any numbness tingling in the foot or any calf pain.

## 2023-03-28 NOTE — IMAGING
[de-identified] : On examination of her left foot she has no swelling, no erythema, no ecchymosis.  She is nontender over the medial or lateral malleolus, ATFL, CFL, PT FL or deltoid ligament.  She is nontender over the talar dome.  She is nontender over the Achilles or the calcaneus, negative Velazquez's test, no calf tenderness.  She has no tenderness to palpation over the second third and fourth distal metatarsals.  She is nontender over the Lisfranc joint.  She has good range of motion of the ankle, sensation is intact throughout, 2+ DP and PT pulses.\par \par X-rays repeated in the office today of the left foot, weightbearing, show well healed minimally displaced second third and fourth metatarsal neck fractures.  The alignment is unchanged from the previous x-rays, there is more callus formation.

## 2023-03-28 NOTE — DISCUSSION/SUMMARY
[de-identified] : At this time she can continue to weight-bear as tolerated.  Activity as tolerated.  I will see her back as needed.  Patient will call me if any other problems or concerns.  Patient verbalized understanding and agreed with the plan, all questions were answered in the office today.\par

## 2023-04-26 ENCOUNTER — APPOINTMENT (OUTPATIENT)
Dept: ORTHOPEDIC SURGERY | Facility: CLINIC | Age: 78
End: 2023-04-26
Payer: MEDICARE

## 2023-04-26 PROCEDURE — 99213 OFFICE O/P EST LOW 20 MIN: CPT

## 2023-04-26 PROCEDURE — 73110 X-RAY EXAM OF WRIST: CPT | Mod: LT

## 2023-04-26 NOTE — ASSESSMENT
[FreeTextEntry1] : The patient comes in status post distal radius fracture. She has been working on her range of motion. She does not have complaints today. \par \par   Left upper extremity:  flexion 50, extension 40, full supination and pronation, full range of motion of fingers, full range of motion the elbow, neurovascular intact\par \par  x-rays show dorsal angulation intra-articular distal radius fracture, healing no change in alignment\par \par Patient is doing well.  She will continue working on her range of motion. She will do at home therapy. She will follow up in a month. If she is feeling better she will cancel.

## 2023-05-31 ENCOUNTER — APPOINTMENT (OUTPATIENT)
Dept: ORTHOPEDIC SURGERY | Facility: CLINIC | Age: 78
End: 2023-05-31
Payer: MEDICARE

## 2023-05-31 DIAGNOSIS — S52.572A OTHER INTRAARTICULAR FRACTURE OF LOWER END OF LEFT RADIUS, INITIAL ENCOUNTER FOR CLOSED FRACTURE: ICD-10-CM

## 2023-05-31 PROCEDURE — 73110 X-RAY EXAM OF WRIST: CPT | Mod: LT

## 2023-05-31 PROCEDURE — 99213 OFFICE O/P EST LOW 20 MIN: CPT

## 2023-05-31 NOTE — ASSESSMENT
[FreeTextEntry1] : The patient comes in status post distal radius fracture. She has been working on her range of motion. The patient is doing well. She does not have complaints today. \par \par   Left upper extremity:  flexion 50, extension 60, full supination and pronation, full range of motion of fingers, full range of motion the elbow, neurovascular intact\par \par  x-rays show dorsal angulation intra-articular distal radius fracture which is now healed\par \par Patient is doing well.  She will continue working on her range of motion. She will do at home therapy as her main complaint is loss of strength.  She is happy to work on that on her own.  I believe that will come with time.  She will follow up as needed.